# Patient Record
Sex: MALE | Race: BLACK OR AFRICAN AMERICAN | NOT HISPANIC OR LATINO | Employment: FULL TIME | RURAL
[De-identification: names, ages, dates, MRNs, and addresses within clinical notes are randomized per-mention and may not be internally consistent; named-entity substitution may affect disease eponyms.]

---

## 2018-08-09 NOTE — PROGRESS NOTES
Subjective   Antwon Sifuentes is a 33 y.o. male.   Problem List  1. Overweight  2. Former tobacco smoker   3. Right forearm pain     PShx  -none     SocialHx  - at [employer]  -former tobacco smoker. Currently vapes  -no alcohol use   -single  -no children   -no illicit drugs     FamilyHx  -mother - none  -father - () suicide     Pt is 34 yo male who  I am seeing for the first time. He is here to establish. He has not seen a PCP in years. He may go to the ER if he has a medical issues. He needs a physical today. And his other main issue is pain in his right arm/forearm that has been present for years. He injured his right arm at home. He slipped on his hand at home.  He has had x-rays. It is getting worse. He also presents with numbness and tingling.  No chest pain.  Pain it comes and goes.   Pain is 5/10 on severity. He is  at [employer]. He is left handed. He mainly throws on his right hand.  No alcohol abuse. He is a former smoker and currently vapes. He is single with no children. No illicit drug use.  He went to the ER recently at [facility] for this issue and was given antiinflammatory medication. IT does help. He could not remember the name     He also has rash on inguinal area. He is sexually active with 10 partners. Sometimes he uses condoms. He would like to be tested for HIV and hepatitis along with STD panel     Arm Pain    The incident occurred more than 1 week ago. The incident occurred at home. The injury mechanism was a fall. The pain is present in the right hand and right forearm. The quality of the pain is described as aching and cramping. The pain does not radiate. The pain is at a severity of 5/10. The pain is mild. The pain has been intermittent since the incident. Associated symptoms include numbness and tingling. Pertinent negatives include no chest pain or muscle weakness. Nothing aggravates the symptoms. He has tried nothing for the symptoms. The treatment provided no  relief.   Upper Extremity Issue   This is a chronic problem. The current episode started more than 1 year ago. The problem occurs intermittently. The problem has been gradually worsening. Associated symptoms include numbness. Pertinent negatives include no abdominal pain, anorexia, arthralgias, change in bowel habit, chest pain, chills, congestion, coughing, diaphoresis, fatigue, fever, headaches, joint swelling, myalgias, nausea, neck pain, rash, sore throat, swollen glands, urinary symptoms, vertigo, visual change, vomiting or weakness. Nothing aggravates the symptoms. He has tried NSAIDs for the symptoms. The treatment provided significant relief.          The following portions of the patient's history were reviewed and updated as appropriate: allergies, current medications, past family history, past medical history, past social history, past surgical history and problem list.  Patient Active Problem List   Diagnosis   • Annual physical exam   • General medical examination   • Tobacco abuse counseling   • Tobacco user   • Need for vaccination   • Overweight   • Encounter for screening for cardiovascular disorders   • Encounter for screening for diabetes mellitus   • Encounter for screening for endocrine disorder   • Jock itch   • Former tobacco use   • Encounter for screening for HIV   • Screening examination for STD (sexually transmitted disease)   • Pain of right hand   • Pain of right forearm     No current outpatient prescriptions on file prior to visit.     No current facility-administered medications on file prior to visit.        Review of Systems   Constitutional: Negative.  Negative for chills, diaphoresis, fatigue and fever.   HENT: Negative.  Negative for congestion and sore throat.    Eyes: Negative.    Respiratory: Negative.  Negative for cough.    Cardiovascular: Negative.  Negative for chest pain.   Gastrointestinal: Negative.  Negative for abdominal pain, anorexia, change in bowel habit, nausea  "and vomiting.   Endocrine: Negative.    Genitourinary: Negative.    Musculoskeletal: Negative.  Negative for arthralgias, joint swelling, myalgias and neck pain.   Skin: Negative.  Negative for rash.   Allergic/Immunologic: Negative.    Neurological: Positive for tingling and numbness. Negative for vertigo and weakness.   Hematological: Negative.    Psychiatric/Behavioral: Negative.        Objective    /77   Pulse 60   Temp 97.9 °F (36.6 °C)   Ht 182.9 cm (72\")   Wt 94.9 kg (209 lb 3.2 oz)   SpO2 97%   BMI 28.37 kg/m²       Physical Exam   Constitutional: He is oriented to person, place, and time. He appears well-developed and well-nourished. No distress.   HENT:   Head: Normocephalic and atraumatic.   Right Ear: External ear normal.   Left Ear: External ear normal.   Eyes: Pupils are equal, round, and reactive to light. Conjunctivae and EOM are normal. Right eye exhibits no discharge. Left eye exhibits no discharge. No scleral icterus.   Neck: Normal range of motion. Neck supple. No JVD present. No tracheal deviation present. No thyromegaly present.   Cardiovascular: Normal rate, regular rhythm and normal heart sounds.    Pulmonary/Chest: Effort normal. No respiratory distress. He has no wheezes.   Abdominal: Soft. He exhibits no distension. There is no tenderness. There is no guarding.   Musculoskeletal: Normal range of motion. He exhibits no edema, tenderness or deformity.   Lymphadenopathy:     He has no cervical adenopathy.   Neurological: He is oriented to person, place, and time. No cranial nerve deficit.   Skin: Skin is warm and dry. No rash noted. He is not diaphoretic. No erythema. No pallor.   Psychiatric: He has a normal mood and affect. His behavior is normal.   Nursing note and vitals reviewed.        Assessment/Plan   Problems Addressed this Visit        Nervous and Auditory    Pain of right hand    Relevant Orders    XR forearm 2 vw right    XR Hand 3+ View Right    Pain of right forearm "    Relevant Orders    XR forearm 2 vw right    XR Hand 3+ View Right       Musculoskeletal and Integument    Jock itch    Relevant Medications    clotrimazole-betamethasone (LOTRISONE) 1-0.05 % cream       Other    Annual physical exam    General medical examination - Primary    Relevant Orders    CBC Auto Differential    Hemoglobin A1c    Lipid Panel    Comprehensive Metabolic Panel    TSH    T4, Free    T3, Free    Vitamin D 25 Hydroxy    Need for vaccination    Relevant Orders    Tdap Vaccine Greater Than or Equal To 8yo IM (Completed)    Overweight    Relevant Orders    Vitamin D 25 Hydroxy    Encounter for screening for cardiovascular disorders    Relevant Orders    Lipid Panel    Encounter for screening for diabetes mellitus    Relevant Orders    Hemoglobin A1c    Encounter for screening for endocrine disorder    Relevant Orders    TSH    T4, Free    T3, Free    Former tobacco use    Encounter for screening for HIV    Relevant Orders    HIV-1 & HIV-2 Antibodies    Hepatitis Panel, Acute    Screening examination for STD (sexually transmitted disease)    Relevant Orders    HIV-1 & HIV-2 Antibodies    Hepatitis Panel, Acute    Hepatitis C RNA, quantitative, PCR (graph)    HSV 1 and 2 IgM, Abs, Indirect    HSV 1 and 2-Specific Ab, IgG    RPR    HIV 2 Antibody Screen w reflex      -annual physical exam today  -regarding history of pain in right arm - will get x-ray to see if there are any new changes.  He declines any NSAIDS for now. States pain is controlled. If it gets worse he will let me know. Consider PT/OT  -STD screening panel  Including HIV test   -recheck in 1-2 weeks   -jock itch - information given today. Will start on clotrimazole cream TID. Drug information provided   -will get labs including cbc, cmp,lipid panel, thyroid studies, hga1c. And vitamin D levels   -gave weight loss information today.   -smoking cessation tips given today.    -tdap vaccination today   -for rash on inguinal area - possible  jock itch. - will give lotrione cream   -advised pt to be safe and call with any questions or concerns. All questions addressed today.    -will write letter stating pt can go back to work          This document has been electronically signed by Rey Nunez MD on August 10, 2018 9:02 AM

## 2018-08-10 ENCOUNTER — OFFICE VISIT (OUTPATIENT)
Dept: FAMILY MEDICINE CLINIC | Facility: CLINIC | Age: 33
End: 2018-08-10

## 2018-08-10 VITALS
HEART RATE: 60 BPM | HEIGHT: 72 IN | DIASTOLIC BLOOD PRESSURE: 77 MMHG | OXYGEN SATURATION: 97 % | BODY MASS INDEX: 28.33 KG/M2 | TEMPERATURE: 97.9 F | SYSTOLIC BLOOD PRESSURE: 122 MMHG | WEIGHT: 209.2 LBS

## 2018-08-10 DIAGNOSIS — Z00.00 GENERAL MEDICAL EXAMINATION: Primary | ICD-10-CM

## 2018-08-10 DIAGNOSIS — Z23 NEED FOR VACCINATION: ICD-10-CM

## 2018-08-10 DIAGNOSIS — E66.3 OVERWEIGHT: ICD-10-CM

## 2018-08-10 DIAGNOSIS — M79.631 PAIN OF RIGHT FOREARM: ICD-10-CM

## 2018-08-10 DIAGNOSIS — Z13.6 ENCOUNTER FOR SCREENING FOR CARDIOVASCULAR DISORDERS: ICD-10-CM

## 2018-08-10 DIAGNOSIS — Z11.4 ENCOUNTER FOR SCREENING FOR HIV: ICD-10-CM

## 2018-08-10 DIAGNOSIS — Z87.891 FORMER TOBACCO USE: ICD-10-CM

## 2018-08-10 DIAGNOSIS — B35.6 JOCK ITCH: ICD-10-CM

## 2018-08-10 DIAGNOSIS — Z13.1 ENCOUNTER FOR SCREENING FOR DIABETES MELLITUS: ICD-10-CM

## 2018-08-10 DIAGNOSIS — M79.641 PAIN OF RIGHT HAND: ICD-10-CM

## 2018-08-10 DIAGNOSIS — Z11.3 SCREENING EXAMINATION FOR STD (SEXUALLY TRANSMITTED DISEASE): ICD-10-CM

## 2018-08-10 DIAGNOSIS — Z13.29 ENCOUNTER FOR SCREENING FOR ENDOCRINE DISORDER: ICD-10-CM

## 2018-08-10 DIAGNOSIS — Z00.00 ANNUAL PHYSICAL EXAM: ICD-10-CM

## 2018-08-10 PROBLEM — Z72.0 TOBACCO USER: Status: ACTIVE | Noted: 2018-08-10

## 2018-08-10 PROBLEM — Z71.6 TOBACCO ABUSE COUNSELING: Status: ACTIVE | Noted: 2018-08-10

## 2018-08-10 PROCEDURE — 90715 TDAP VACCINE 7 YRS/> IM: CPT | Performed by: FAMILY MEDICINE

## 2018-08-10 PROCEDURE — 99204 OFFICE O/P NEW MOD 45 MIN: CPT | Performed by: FAMILY MEDICINE

## 2018-08-10 PROCEDURE — 90471 IMMUNIZATION ADMIN: CPT | Performed by: FAMILY MEDICINE

## 2018-08-10 RX ORDER — CLOTRIMAZOLE AND BETAMETHASONE DIPROPIONATE 10; .64 MG/G; MG/G
CREAM TOPICAL EVERY 12 HOURS SCHEDULED
Qty: 45 G | Refills: 3 | Status: SHIPPED | OUTPATIENT
Start: 2018-08-10 | End: 2018-08-24

## 2018-08-10 NOTE — PATIENT INSTRUCTIONS
Exercising to Lose Weight  Exercising can help you to lose weight. In order to lose weight through exercise, you need to do vigorous-intensity exercise. You can tell that you are exercising with vigorous intensity if you are breathing very hard and fast and cannot hold a conversation while exercising.  Moderate-intensity exercise helps to maintain your current weight. You can tell that you are exercising at a moderate level if you have a higher heart rate and faster breathing, but you are still able to hold a conversation.  How often should I exercise?  Choose an activity that you enjoy and set realistic goals. Your health care provider can help you to make an activity plan that works for you. Exercise regularly as directed by your health care provider. This may include:  · Doing resistance training twice each week, such as:  ? Push-ups.  ? Sit-ups.  ? Lifting weights.  ? Using resistance bands.  · Doing a given intensity of exercise for a given amount of time. Choose from these options:  ? 150 minutes of moderate-intensity exercise every week.  ? 75 minutes of vigorous-intensity exercise every week.  ? A mix of moderate-intensity and vigorous-intensity exercise every week.    Children, pregnant women, people who are out of shape, people who are overweight, and older adults may need to consult a health care provider for individual recommendations. If you have any sort of medical condition, be sure to consult your health care provider before starting a new exercise program.  What are some activities that can help me to lose weight?  · Walking at a rate of at least 4.5 miles an hour.  · Jogging or running at a rate of 5 miles per hour.  · Biking at a rate of at least 10 miles per hour.  · Lap swimming.  · Roller-skating or in-line skating.  · Cross-country skiing.  · Vigorous competitive sports, such as football, basketball, and soccer.  · Jumping rope.  · Aerobic dancing.  How can I be more active in my day-to-day  activities?  · Use the stairs instead of the elevator.  · Take a walk during your lunch break.  · If you drive, park your car farther away from work or school.  · If you take public transportation, get off one stop early and walk the rest of the way.  · Make all of your phone calls while standing up and walking around.  · Get up, stretch, and walk around every 30 minutes throughout the day.  What guidelines should I follow while exercising?  · Do not exercise so much that you hurt yourself, feel dizzy, or get very short of breath.  · Consult your health care provider prior to starting a new exercise program.  · Wear comfortable clothes and shoes with good support.  · Drink plenty of water while you exercise to prevent dehydration or heat stroke. Body water is lost during exercise and must be replaced.  · Work out until you breathe faster and your heart beats faster.  This information is not intended to replace advice given to you by your health care provider. Make sure you discuss any questions you have with your health care provider.  Document Released: 01/20/2012 Document Revised: 05/25/2017 Document Reviewed: 05/21/2015  [employer] Interactive Patient Education © 2018 [employer]    Calorie Counting for Weight Loss  Calories are units of energy. Your body needs a certain amount of calories from food to keep you going throughout the day. When you eat more calories than your body needs, your body stores the extra calories as fat. When you eat fewer calories than your body needs, your body burns fat to get the energy it needs.  Calorie counting means keeping track of how many calories you eat and drink each day. Calorie counting can be helpful if you need to lose weight. If you make sure to eat fewer calories than your body needs, you should lose weight. Ask your health care provider what a healthy weight is for you.  For calorie counting to work, you will need to eat the right number of calories in a day in order  to lose a healthy amount of weight per week. A dietitian can help you determine how many calories you need in a day and will give you suggestions on how to reach your calorie goal.  · A healthy amount of weight to lose per week is usually 1-2 lb (0.5-0.9 kg). This usually means that your daily calorie intake should be reduced by 500-750 calories.  · Eating 1,200 - 1,500 calories per day can help most women lose weight.  · Eating 1,500 - 1,800 calories per day can help most men lose weight.    What do I need to know about calorie counting?  In order to meet your daily calorie goal, you will need to:  · Find out how many calories are in each food you would like to eat. Try to do this before you eat.  · Decide how much of the food you plan to eat.  · Write down what you ate and how many calories it had. Doing this is called keeping a food log.    To successfully lose weight, it is important to balance calorie counting with a healthy lifestyle that includes regular activity. Aim for 150 minutes of moderate exercise (such as walking) or 75 minutes of vigorous exercise (such as running) each week.  Where do I find calorie information?    The number of calories in a food can be found on a Nutrition Facts label. If a food does not have a Nutrition Facts label, try to look up the calories online or ask your dietitian for help.  Remember that calories are listed per serving. If you choose to have more than one serving of a food, you will have to multiply the calories per serving by the amount of servings you plan to eat. For example, the label on a package of bread might say that a serving size is 1 slice and that there are 90 calories in a serving. If you eat 1 slice, you will have eaten 90 calories. If you eat 2 slices, you will have eaten 180 calories.  How do I keep a food log?  Immediately after each meal, record the following information in your food log:  · What you ate. Don't forget to include toppings, sauces, and  "other extras on the food.  · How much you ate. This can be measured in cups, ounces, or number of items.  · How many calories each food and drink had.  · The total number of calories in the meal.    Keep your food log near you, such as in a small notebook in your pocket, or use a mobile aileen or website. Some programs will calculate calories for you and show you how many calories you have left for the day to meet your goal.  What are some calorie counting tips?  · Use your calories on foods and drinks that will fill you up and not leave you hungry:  ? Some examples of foods that fill you up are nuts and nut butters, vegetables, lean proteins, and high-fiber foods like whole grains. High-fiber foods are foods with more than 5 g fiber per serving.  ? Drinks such as sodas, specialty coffee drinks, alcohol, and juices have a lot of calories, yet do not fill you up.  · Eat nutritious foods and avoid empty calories. Empty calories are calories you get from foods or beverages that do not have many vitamins or protein, such as candy, sweets, and soda. It is better to have a nutritious high-calorie food (such as an avocado) than a food with few nutrients (such as a bag of chips).  · Know how many calories are in the foods you eat most often. This will help you calculate calorie counts faster.  · Pay attention to calories in drinks. Low-calorie drinks include water and unsweetened drinks.  · Pay attention to nutrition labels for \"low fat\" or \"fat free\" foods. These foods sometimes have the same amount of calories or more calories than the full fat versions. They also often have added sugar, starch, or salt, to make up for flavor that was removed with the fat.  · Find a way of tracking calories that works for you. Get creative. Try different apps or programs if writing down calories does not work for you.  What are some portion control tips?  · Know how many calories are in a serving. This will help you know how many servings of " a certain food you can have.  · Use a measuring cup to measure serving sizes. You could also try weighing out portions on a kitchen scale. With time, you will be able to estimate serving sizes for some foods.  · Take some time to put servings of different foods on your favorite plates, bowls, and cups so you know what a serving looks like.  · Try not to eat straight from a bag or box. Doing this can lead to overeating. Put the amount you would like to eat in a cup or on a plate to make sure you are eating the right portion.  · Use smaller plates, glasses, and bowls to prevent overeating.  · Try not to multitask (for example, watch TV or use your computer) while eating. If it is time to eat, sit down at a table and enjoy your food. This will help you to know when you are full. It will also help you to be aware of what you are eating and how much you are eating.  What are tips for following this plan?  Reading food labels  · Check the calorie count compared to the serving size. The serving size may be smaller than what you are used to eating.  · Check the source of the calories. Make sure the food you are eating is high in vitamins and protein and low in saturated and trans fats.  Shopping  · Read nutrition labels while you shop. This will help you make healthy decisions before you decide to purchase your food.  · Make a grocery list and stick to it.  Cooking  · Try to cook your favorite foods in a healthier way. For example, try baking instead of frying.  · Use low-fat dairy products.  Meal planning  · Use more fruits and vegetables. Half of your plate should be fruits and vegetables.  · Include lean proteins like poultry and fish.  How do I count calories when eating out?  · Ask for smaller portion sizes.  · Consider sharing an entree and sides instead of getting your own entree.  · If you get your own entree, eat only half. Ask for a box at the beginning of your meal and put the rest of your entree in it so you are  not tempted to eat it.  · If calories are listed on the menu, choose the lower calorie options.  · Choose dishes that include vegetables, fruits, whole grains, low-fat dairy products, and lean protein.  · Choose items that are boiled, broiled, grilled, or steamed. Stay away from items that are buttered, battered, fried, or served with cream sauce. Items labeled “crispy” are usually fried, unless stated otherwise.  · Choose water, low-fat milk, unsweetened iced tea, or other drinks without added sugar. If you want an alcoholic beverage, choose a lower calorie option such as a glass of wine or light beer.  · Ask for dressings, sauces, and syrups on the side. These are usually high in calories, so you should limit the amount you eat.  · If you want a salad, choose a garden salad and ask for grilled meats. Avoid extra toppings like bradshaw, cheese, or fried items. Ask for the dressing on the side, or ask for olive oil and vinegar or lemon to use as dressing.  · Estimate how many servings of a food you are given. For example, a serving of cooked rice is ½ cup or about the size of half a baseball. Knowing serving sizes will help you be aware of how much food you are eating at restaurants. The list below tells you how big or small some common portion sizes are based on everyday objects:  ? 1 oz--4 stacked dice.  ? 3 oz--1 deck of cards.  ? 1 tsp--1 die.  ? 1 Tbsp--½ a ping-pong ball.  ? 2 Tbsp--1 ping-pong ball.  ? ½ cup--½ baseball.  ? 1 cup--1 baseball.  Summary  · Calorie counting means keeping track of how many calories you eat and drink each day. If you eat fewer calories than your body needs, you should lose weight.  · A healthy amount of weight to lose per week is usually 1-2 lb (0.5-0.9 kg). This usually means reducing your daily calorie intake by 500-750 calories.  · The number of calories in a food can be found on a Nutrition Facts label. If a food does not have a Nutrition Facts label, try to look up the calories  online or ask your dietitian for help.  · Use your calories on foods and drinks that will fill you up, and not on foods and drinks that will leave you hungry.  · Use smaller plates, glasses, and bowls to prevent overeating.  This information is not intended to replace advice given to you by your health care provider. Make sure you discuss any questions you have with your health care provider.  Document Released: 12/18/2006 Document Revised: 11/17/2017 Document Reviewed: 11/17/2017  [employer] Interactive Patient Education © 2018 [employer]    Steps to Quit Smoking  Smoking tobacco can be harmful to your health and can affect almost every organ in your body. Smoking puts you, and those around you, at risk for developing many serious chronic diseases. Quitting smoking is difficult, but it is one of the best things that you can do for your health. It is never too late to quit.  What are the benefits of quitting smoking?  When you quit smoking, you lower your risk of developing serious diseases and conditions, such as:  · Lung cancer or lung disease, such as COPD.  · Heart disease.  · Stroke.  · Heart attack.  · Infertility.  · Osteoporosis and bone fractures.    Additionally, symptoms such as coughing, wheezing, and shortness of breath may get better when you quit. You may also find that you get sick less often because your body is stronger at fighting off colds and infections. If you are pregnant, quitting smoking can help to reduce your chances of having a baby of low birth weight.  How do I get ready to quit?  When you decide to quit smoking, create a plan to make sure that you are successful. Before you quit:  · Pick a date to quit. Set a date within the next two weeks to give you time to prepare.  · Write down the reasons why you are quitting. Keep this list in places where you will see it often, such as on your bathroom mirror or in your car or wallet.  · Identify the people, places, things, and activities that  make you want to smoke (triggers) and avoid them. Make sure to take these actions:  ? Throw away all cigarettes at home, at work, and in your car.  ? Throw away smoking accessories, such as ashtrays and lighters.  ? Clean your car and make sure to empty the ashtray.  ? Clean your home, including curtains and carpets.  · Tell your family, friends, and coworkers that you are quitting. Support from your loved ones can make quitting easier.  · Talk with your health care provider about your options for quitting smoking.  · Find out what treatment options are covered by your health insurance.    What strategies can I use to quit smoking?  Talk with your healthcare provider about different strategies to quit smoking. Some strategies include:  · Quitting smoking altogether instead of gradually lessening how much you smoke over a period of time. Research shows that quitting “cold turkey” is more successful than gradually quitting.  · Attending in-person counseling to help you build problem-solving skills. You are more likely to have success in quitting if you attend several counseling sessions. Even short sessions of 10 minutes can be effective.  · Finding resources and support systems that can help you to quit smoking and remain smoke-free after you quit. These resources are most helpful when you use them often. They can include:  ? Online chats with a counselor.  ? Telephone quitlines.  ? Printed self-help materials.  ? Support groups or group counseling.  ? Text messaging programs.  ? Mobile phone applications.  · Taking medicines to help you quit smoking. (If you are pregnant or breastfeeding, talk with your health care provider first.) Some medicines contain nicotine and some do not. Both types of medicines help with cravings, but the medicines that include nicotine help to relieve withdrawal symptoms. Your health care provider may recommend:  ? Nicotine patches, gum, or lozenges.  ? Nicotine inhalers or  sprays.  ? Non-nicotine medicine that is taken by mouth.    Talk with your health care provider about combining strategies, such as taking medicines while you are also receiving in-person counseling. Using these two strategies together makes you more likely to succeed in quitting than if you used either strategy on its own.  If you are pregnant or breastfeeding, talk with your health care provider about finding counseling or other support strategies to quit smoking. Do not take medicine to help you quit smoking unless told to do so by your health care provider.  What things can I do to make it easier to quit?  Quitting smoking might feel overwhelming at first, but there is a lot that you can do to make it easier. Take these important actions:  · Reach out to your family and friends and ask that they support and encourage you during this time. Call telephone quitlines, reach out to support groups, or work with a counselor for support.  · Ask people who smoke to avoid smoking around you.  · Avoid places that trigger you to smoke, such as bars, parties, or smoke-break areas at work.  · Spend time around people who do not smoke.  · Lessen stress in your life, because stress can be a smoking trigger for some people. To lessen stress, try:  ? Exercising regularly.  ? Deep-breathing exercises.  ? Yoga.  ? Meditating.  ? Performing a body scan. This involves closing your eyes, scanning your body from head to toe, and noticing which parts of your body are particularly tense. Purposefully relax the muscles in those areas.  · Download or purchase mobile phone or tablet apps (applications) that can help you stick to your quit plan by providing reminders, tips, and encouragement. There are many free apps, such as QuitGuide from the CDC (Centers for Disease Control and Prevention). You can find other support for quitting smoking (smoking cessation) through smokefree.gov and other websites.    How will I feel when I quit  smoking?  Within the first 24 hours of quitting smoking, you may start to feel some withdrawal symptoms. These symptoms are usually most noticeable 2-3 days after quitting, but they usually do not last beyond 2-3 weeks. Changes or symptoms that you might experience include:  · Mood swings.  · Restlessness, anxiety, or irritation.  · Difficulty concentrating.  · Dizziness.  · Strong cravings for sugary foods in addition to nicotine.  · Mild weight gain.  · Constipation.  · Nausea.  · Coughing or a sore throat.  · Changes in how your medicines work in your body.  · A depressed mood.  · Difficulty sleeping (insomnia).    After the first 2-3 weeks of quitting, you may start to notice more positive results, such as:  · Improved sense of smell and taste.  · Decreased coughing and sore throat.  · Slower heart rate.  · Lower blood pressure.  · Clearer skin.  · The ability to breathe more easily.  · Fewer sick days.    Quitting smoking is very challenging for most people. Do not get discouraged if you are not successful the first time. Some people need to make many attempts to quit before they achieve long-term success. Do your best to stick to your quit plan, and talk with your health care provider if you have any questions or concerns.  This information is not intended to replace advice given to you by your health care provider. Make sure you discuss any questions you have with your health care provider.  Document Released: 12/12/2002 Document Revised: 08/15/2017 Document Reviewed: 05/03/2016  [employer] Interactive Patient Education © 2018 [employer]  Clotrimazole skin cream, lotion, or solution  What is this medicine?  CLOTRIMAZOLE (kloe TRIM a zole) is an antifungal medicine. It is used to treat certain kinds of fungal or yeast infections of the skin.  This medicine may be used for other purposes; ask your health care provider or pharmacist if you have questions.  COMMON BRAND NAME(S): Anti-Fungal, Antifungal, Cruex,  Desenex, Fungoid, Lotrimin, Lotrimin AF, Lotrimin AF Ringworm  What should I tell my health care provider before I take this medicine?  They need to know if you have any of these conditions:  -an unusual or allergic reaction to clotrimazole, other antifungals or medicines, foods, dyes or preservatives  -pregnant or trying to get pregnant  -breast-feeding  How should I use this medicine?  This medicine is for external use only. Do not take by mouth. Follow the directions on the prescription label. Wash your hands before and after use. If treating a hand or nail infection, wash hands before use only. Apply a thin layer to the affected area and a small amount to the surrounding area. Rub in gently. Do not get this medicine in your eyes. If you do, rinse out with plenty of cool tap water. Use this medicine at regular intervals. Do not use more often than directed. Finish the full course prescribed by your doctor or health care professional even if you think you are better. Do not stop using except on your doctor's advice.  Talk to your pediatrician regarding the use of this medicine in children. While this drug has been used in young children for selected conditions, precautions do apply.  Overdosage: If you think you have taken too much of this medicine contact a poison control center or emergency room at once.  NOTE: This medicine is only for you. Do not share this medicine with others.  What if I miss a dose?  If you miss a dose, use it as soon as you can. If it is almost time for your next dose, use only that dose. Do not use double or extra doses.  What may interact with this medicine?  -amphotericin b  -topical products that have nystatin  This list may not describe all possible interactions. Give your health care provider a list of all the medicines, herbs, non-prescription drugs, or dietary supplements you use. Also tell them if you smoke, drink alcohol, or use illegal drugs. Some items may interact with your  medicine.  What should I watch for while using this medicine?  Tell your doctor or health care professional if your symptoms do not start to improve after 7 days. Do not self-medicate for more than one week.  If you are using this medicine for 'jock itch' be sure to dry the groin completely after bathing. Do not wear underwear that is tight-fitting or made from synthetic fibers like rosibel or nylon. Wear loose-fitting, cotton underwear.  If you are using this medicine for athlete's foot be sure to dry your feet carefully after bathing, especially between the toes. Do not wear socks made from wool or synthetic materials like rosibel or nylon. Wear clean cotton socks and change them at least once a day, change them more if your feet sweat a lot. Also, try to wear sandals or shoes that are well-ventilated.  What side effects may I notice from receiving this medicine?  Side effects that usually do not require medical attention (report to your doctor or health care professional if they continue or are bothersome):  -allergic reactions like skin rash, itching or hives, swelling of the face, lips, or tongue  -skin irritation, burning  This list may not describe all possible side effects. Call your doctor for medical advice about side effects. You may report side effects to FDA at 7-415-FDA-8892.  Where should I keep my medicine?  Keep out of the reach of children.  Store at room temperature between 2 to 30 degrees C (36 to 86 degrees F). Do not freeze. Throw away any unused medicine after the expiration date.  NOTE: This sheet is a summary. It may not cover all possible information. If you have questions about this medicine, talk to your doctor, pharmacist, or health care provider.  © 2018 [employer] (2017-01-18 16:30:18)

## 2018-08-14 LAB
25(OH)D3 SERPL-MCNC: 30.9 NG/ML (ref 30–100)
ALBUMIN SERPL-MCNC: 5 G/DL (ref 3.4–4.8)
ALBUMIN/GLOB SERPL: 1.4 G/DL (ref 1.1–1.8)
ALP SERPL-CCNC: 44 U/L (ref 38–126)
ALT SERPL W P-5'-P-CCNC: 41 U/L (ref 21–72)
ANION GAP SERPL CALCULATED.3IONS-SCNC: 11 MMOL/L (ref 5–15)
ARTICHOKE IGE QN: 52 MG/DL (ref 1–129)
AST SERPL-CCNC: 23 U/L (ref 17–59)
BASOPHILS # BLD MANUAL: 0.07 10*3/MM3 (ref 0–0.2)
BASOPHILS NFR BLD AUTO: 1 % (ref 0–2)
BILIRUB SERPL-MCNC: 0.9 MG/DL (ref 0.2–1.3)
BUN BLD-MCNC: 21 MG/DL (ref 7–21)
BUN/CREAT SERPL: 26.6 (ref 7–25)
CALCIUM SPEC-SCNC: 9.8 MG/DL (ref 8.4–10.2)
CHLORIDE SERPL-SCNC: 99 MMOL/L (ref 95–110)
CHOLEST SERPL-MCNC: 140 MG/DL (ref 0–199)
CO2 SERPL-SCNC: 29 MMOL/L (ref 22–31)
CREAT BLD-MCNC: 0.79 MG/DL (ref 0.7–1.3)
DEPRECATED RDW RBC AUTO: 39.2 FL (ref 35.1–43.9)
EOSINOPHIL # BLD MANUAL: 0.07 10*3/MM3 (ref 0–0.7)
EOSINOPHIL NFR BLD MANUAL: 1 % (ref 0–7)
ERYTHROCYTE [DISTWIDTH] IN BLOOD BY AUTOMATED COUNT: 12.3 % (ref 11.5–14.5)
GFR SERPL CREATININE-BSD FRML MDRD: 138 ML/MIN/1.73 (ref 70–162)
GLOBULIN UR ELPH-MCNC: 3.5 GM/DL (ref 2.3–3.5)
GLUCOSE BLD-MCNC: 80 MG/DL (ref 60–100)
HBA1C MFR BLD: 4.7 % (ref 4–5.6)
HCT VFR BLD AUTO: 43.6 % (ref 39–49)
HDLC SERPL-MCNC: 60 MG/DL (ref 60–200)
HGB BLD-MCNC: 15.2 G/DL (ref 13.7–17.3)
HIV1+2 AB SER QL: NEGATIVE
LDLC/HDLC SERPL: 1.17 {RATIO} (ref 0–3.55)
LYMPHOCYTES # BLD MANUAL: 3.65 10*3/MM3 (ref 0.6–4.2)
LYMPHOCYTES NFR BLD MANUAL: 3 % (ref 0–12)
LYMPHOCYTES NFR BLD MANUAL: 49 % (ref 10–50)
MCH RBC QN AUTO: 30.9 PG (ref 26.5–34)
MCHC RBC AUTO-ENTMCNC: 34.9 G/DL (ref 31.5–36.3)
MCV RBC AUTO: 88.6 FL (ref 80–98)
MONOCYTES # BLD AUTO: 0.22 10*3/MM3 (ref 0–0.9)
NEUTROPHILS # BLD AUTO: 2.75 10*3/MM3 (ref 2–8.6)
NEUTROPHILS NFR BLD MANUAL: 37 % (ref 37–80)
PLAT MORPH BLD: NORMAL
PLATELET # BLD AUTO: 266 10*3/MM3 (ref 150–450)
PMV BLD AUTO: 9.8 FL (ref 8–12)
POTASSIUM BLD-SCNC: 4.2 MMOL/L (ref 3.5–5.1)
PROT SERPL-MCNC: 8.5 G/DL (ref 6.3–8.6)
RBC # BLD AUTO: 4.92 10*6/MM3 (ref 4.37–5.74)
RBC MORPH BLD: NORMAL
SODIUM BLD-SCNC: 139 MMOL/L (ref 137–145)
T4 FREE SERPL-MCNC: 1.44 NG/DL (ref 0.78–2.19)
TRIGL SERPL-MCNC: 50 MG/DL (ref 20–199)
TSH SERPL DL<=0.05 MIU/L-ACNC: 1.31 MIU/ML (ref 0.46–4.68)
VARIANT LYMPHS NFR BLD MANUAL: 9 % (ref 0–5)
WBC MORPH BLD: NORMAL
WBC NRBC COR # BLD: 7.44 10*3/MM3 (ref 3.2–9.8)

## 2018-08-14 PROCEDURE — 80074 ACUTE HEPATITIS PANEL: CPT | Performed by: FAMILY MEDICINE

## 2018-08-14 PROCEDURE — G0432 EIA HIV-1/HIV-2 SCREEN: HCPCS | Performed by: FAMILY MEDICINE

## 2018-08-14 PROCEDURE — 85025 COMPLETE CBC W/AUTO DIFF WBC: CPT | Performed by: FAMILY MEDICINE

## 2018-08-14 PROCEDURE — 86702 HIV-2 ANTIBODY: CPT | Performed by: FAMILY MEDICINE

## 2018-08-14 PROCEDURE — 84439 ASSAY OF FREE THYROXINE: CPT | Performed by: FAMILY MEDICINE

## 2018-08-14 PROCEDURE — 82306 VITAMIN D 25 HYDROXY: CPT | Performed by: FAMILY MEDICINE

## 2018-08-14 PROCEDURE — 83036 HEMOGLOBIN GLYCOSYLATED A1C: CPT | Performed by: FAMILY MEDICINE

## 2018-08-14 PROCEDURE — 86696 HERPES SIMPLEX TYPE 2 TEST: CPT | Performed by: FAMILY MEDICINE

## 2018-08-14 PROCEDURE — 84443 ASSAY THYROID STIM HORMONE: CPT | Performed by: FAMILY MEDICINE

## 2018-08-14 PROCEDURE — 86592 SYPHILIS TEST NON-TREP QUAL: CPT | Performed by: FAMILY MEDICINE

## 2018-08-14 PROCEDURE — 84481 FREE ASSAY (FT-3): CPT | Performed by: FAMILY MEDICINE

## 2018-08-14 PROCEDURE — 85007 BL SMEAR W/DIFF WBC COUNT: CPT | Performed by: FAMILY MEDICINE

## 2018-08-14 PROCEDURE — 80053 COMPREHEN METABOLIC PANEL: CPT | Performed by: FAMILY MEDICINE

## 2018-08-14 PROCEDURE — 86695 HERPES SIMPLEX TYPE 1 TEST: CPT | Performed by: FAMILY MEDICINE

## 2018-08-14 PROCEDURE — 87522 HEPATITIS C REVRS TRNSCRPJ: CPT | Performed by: FAMILY MEDICINE

## 2018-08-14 PROCEDURE — 80061 LIPID PANEL: CPT | Performed by: FAMILY MEDICINE

## 2018-08-15 LAB
HAV IGM SERPL QL IA: NEGATIVE
HBV CORE IGM SERPL QL IA: NEGATIVE
HBV SURFACE AG SERPL QL IA: NEGATIVE
HCV AB SER DONR QL: NEGATIVE
RPR SER QL: NORMAL
T3FREE SERPL-MCNC: 4.1 PG/ML (ref 2–4.4)

## 2018-08-16 LAB
HCV RNA SERPL NAA+PROBE-ACNC: NORMAL IU/ML
HSV1 IGG SER IA-ACNC: 17.6 INDEX (ref 0–0.9)
HSV2 IGG SER IA-ACNC: <0.91 INDEX (ref 0–0.9)
TEST INFORMATION: NORMAL

## 2018-08-19 LAB
HSV1 IGM TITR SER IF: NORMAL TITER
HSV2 IGM TITR SER IF: NORMAL TITER

## 2018-08-20 LAB — HIV 2 AB SERPL QL IA: NEGATIVE

## 2018-08-22 NOTE — PROGRESS NOTES
Subjective   Antwon Sifuentes is a 33 y.o. male.   Problem List  1. Overweight  2. Former tobacco smoker   3. Right forearm pain   4. HSV type 1 positive     PShx  -none     SocialHx  - at [facility]  -former tobacco smoker. Currently vapes  -no alcohol use   -single  -no children   -no illicit drugs     FamilyHx  -mother - none  -father - () suicide     Pt is 34 yo male who  I am seeing for the first time. He is here to establish. He has not seen a PCP in years. He may go to the ER if he has a medical issues. He needs a physical today. And his other main issue is pain in his right arm/forearm that has been present for years. He injured his right arm at home. He slipped on his hand at home.  He has had x-rays. It is getting worse. He also presents with numbness and tingling.  No chest pain.  Pain it comes and goes.   Pain is 5/10 on severity. He is  at [facility]. He is left handed. He mainly throws on his right hand.  No alcohol abuse. He is a former smoker and currently vapes. He is single with no children. No illicit drug use.  He went to the ER recently at [facility] for this issue and was given antiinflammatory medication. IT does help. He could not remember the name     He also has rash on inguinal area. He is sexually active with 10 partners. Sometimes he uses condoms. He would like to be tested for HIV and hepatitis along with STD panel     18 pt is here recheck and go over labwork. He had full STD panel that was negatiev for HIV and RPRO. Hepatitis panel was normal Vitamin D was normal his thyroid studies were normal.  Hga1c was normal and CMP. Showed normal renal and liver function with slightly elevation in albumin and BUN/CR ratio. CBC showed normal WBC and hemoglobin. Pt was positive for HSV 1 but  Not  HSV 2 . He was given clotrimazole for his jock itch.  In regards to hand and arm pain. X-ray of foreamr showed of right small to moderate sized olecranon spur. On right hand  X-ray  there is 1.2 mm fracture of tip of middle finger    His main issue is a knot on his left hand present for 10 years. It is tender when he presses on it.  It is about 1-2 cm in diameter. No change in size recently.        HIs other main issue is fishy body odor that has been present for over 15 years. He looked online and may or may not have a genetic disorder. He did look up trimetyhlaminuria.  He does eat foods high in choline such as eggs, nuts,   He has tried varous body odor sprays and bathing and chemicals with no relief. He would like to see a Specialist.  He has not seen Dermatology yet This occurs 2-4 times a year      Arm Pain    The incident occurred more than 1 week ago. The incident occurred at home. The injury mechanism was a fall. The pain is present in the right hand and right forearm. The quality of the pain is described as aching and cramping. The pain does not radiate. The pain is at a severity of 5/10. The pain is mild. The pain has been intermittent since the incident. Associated symptoms include numbness and tingling. Pertinent negatives include no chest pain or muscle weakness. Nothing aggravates the symptoms. He has tried nothing for the symptoms. The treatment provided no relief.   Upper Extremity Issue   This is a chronic problem. The current episode started more than 1 year ago. The problem occurs intermittently. The problem has been gradually worsening. Associated symptoms include numbness. Pertinent negatives include no abdominal pain, anorexia, arthralgias, change in bowel habit, chest pain, chills, congestion, coughing, diaphoresis, fatigue, fever, headaches, joint swelling, myalgias, nausea, neck pain, rash, sore throat, swollen glands, urinary symptoms, vertigo, visual change, vomiting or weakness. Nothing aggravates the symptoms. He has tried NSAIDs for the symptoms. The treatment provided significant relief.          The following portions of the patient's history were reviewed and  "updated as appropriate: allergies, current medications, past family history, past medical history, past social history, past surgical history and problem list.  Patient Active Problem List   Diagnosis   • Annual physical exam   • General medical examination   • Tobacco abuse counseling   • Tobacco user   • Need for vaccination   • Overweight   • Encounter for screening for cardiovascular disorders   • Encounter for screening for diabetes mellitus   • Encounter for screening for endocrine disorder   • Jock itch   • Former tobacco use   • Encounter for screening for HIV   • Screening examination for STD (sexually transmitted disease)   • Pain of right hand   • Pain of right forearm   • HSV infection     Current Outpatient Prescriptions on File Prior to Visit   Medication Sig Dispense Refill   • [DISCONTINUED] clotrimazole-betamethasone (LOTRISONE) 1-0.05 % cream Apply  topically to the appropriate area as directed Every 12 (Twelve) Hours. 45 g 3     No current facility-administered medications on file prior to visit.        Review of Systems   Constitutional: Negative.  Negative for chills, diaphoresis, fatigue and fever.   HENT: Negative.  Negative for congestion and sore throat.    Eyes: Negative.    Respiratory: Negative.  Negative for cough.    Cardiovascular: Negative.  Negative for chest pain.   Gastrointestinal: Negative.  Negative for abdominal pain, anorexia, change in bowel habit, nausea and vomiting.   Endocrine: Negative.    Genitourinary: Negative.    Musculoskeletal: Negative.  Negative for arthralgias, joint swelling, myalgias and neck pain.   Skin: Negative.  Negative for rash.        Abnormal body odor smell    Allergic/Immunologic: Negative.    Neurological: Positive for tingling and numbness. Negative for vertigo and weakness.   Hematological: Negative.    Psychiatric/Behavioral: Negative.        Objective    /90   Pulse 64   Temp 98.6 °F (37 °C)   Ht 182.9 cm (72\")   Wt 91.5 kg (201 lb " 12.8 oz)   SpO2 99%   BMI 27.37 kg/m²      Office Visit on 08/10/2018   Component Date Value Ref Range Status   • WBC 08/14/2018 7.44  3.20 - 9.80 10*3/mm3 Final   • RBC 08/14/2018 4.92  4.37 - 5.74 10*6/mm3 Final   • Hemoglobin 08/14/2018 15.2  13.7 - 17.3 g/dL Final   • Hematocrit 08/14/2018 43.6  39.0 - 49.0 % Final   • MCV 08/14/2018 88.6  80.0 - 98.0 fL Final   • MCH 08/14/2018 30.9  26.5 - 34.0 pg Final   • MCHC 08/14/2018 34.9  31.5 - 36.3 g/dL Final   • RDW 08/14/2018 12.3  11.5 - 14.5 % Final   • RDW-SD 08/14/2018 39.2  35.1 - 43.9 fl Final   • MPV 08/14/2018 9.8  8.0 - 12.0 fL Final   • Platelets 08/14/2018 266  150 - 450 10*3/mm3 Final   • Hemoglobin A1C 08/14/2018 4.7  4 - 5.6 % Final   • Total Cholesterol 08/14/2018 140  0 - 199 mg/dL Final   • Triglycerides 08/14/2018 50  20 - 199 mg/dL Final   • HDL Cholesterol 08/14/2018 60  60 - 200 mg/dL Final   • LDL Cholesterol  08/14/2018 52  1 - 129 mg/dL Final   • LDL/HDL Ratio 08/14/2018 1.17  0.00 - 3.55 Final   • Glucose 08/14/2018 80  60 - 100 mg/dL Final   • BUN 08/14/2018 21  7 - 21 mg/dL Final   • Creatinine 08/14/2018 0.79  0.70 - 1.30 mg/dL Final   • Sodium 08/14/2018 139  137 - 145 mmol/L Final   • Potassium 08/14/2018 4.2  3.5 - 5.1 mmol/L Final   • Chloride 08/14/2018 99  95 - 110 mmol/L Final   • CO2 08/14/2018 29.0  22.0 - 31.0 mmol/L Final   • Calcium 08/14/2018 9.8  8.4 - 10.2 mg/dL Final   • Total Protein 08/14/2018 8.5  6.3 - 8.6 g/dL Final   • Albumin 08/14/2018 5.00* 3.40 - 4.80 g/dL Final   • ALT (SGPT) 08/14/2018 41  21 - 72 U/L Final   • AST (SGOT) 08/14/2018 23  17 - 59 U/L Final   • Alkaline Phosphatase 08/14/2018 44  38 - 126 U/L Final   • Total Bilirubin 08/14/2018 0.9  0.2 - 1.3 mg/dL Final   • eGFR   Amer 08/14/2018 138  70 - 162 mL/min/1.73 Final   • Globulin 08/14/2018 3.5  2.3 - 3.5 gm/dL Final   • A/G Ratio 08/14/2018 1.4  1.1 - 1.8 g/dL Final   • BUN/Creatinine Ratio 08/14/2018 26.6* 7.0 - 25.0 Final   • Anion Gap  08/14/2018 11.0  5.0 - 15.0 mmol/L Final   • TSH 08/14/2018 1.310  0.460 - 4.680 mIU/mL Final   • Free T4 08/14/2018 1.44  0.78 - 2.19 ng/dL Final   • T3, Free 08/14/2018 4.1  2.0 - 4.4 pg/mL Final   • 25 Hydroxy, Vitamin D 08/14/2018 30.9  30.0 - 100.0 ng/ml Final   • Hepatitis C Ab 08/14/2018 Negative  Negative Final   • Hep A IgM 08/14/2018 Negative  Negative Final   • Hep B C IgM 08/14/2018 Negative  Negative Final   • Hepatitis B Surface Ag 08/14/2018 Negative  Negative Final   • Hepatitis C Quantitation 08/14/2018 HCV Not Detected  IU/mL Final   • Test Information 08/14/2018 Comment   Final    The quantitative range of this assay is 15 IU/mL to 100 million IU/mL.   • HSV 1 IgM 08/14/2018 <1:10  <1:10 titer Final   • HSV 2 IgM 08/14/2018 <1:10  <1:10 titer Final    HSV 1 and HSV 2 share many cross-reacting antigens. Elevated titers  to both HSV 1 and HSV 2 may represent crossreactive HSV antibodies  rather than exposure to both HSV 1 and HSV 2.  Results for this test are for research purposes only by the assay's  . The performance characteristics of this product have  not been established.  Results should not be used as a diagnostic  procedure without confirmation of the diagnosis by another medically  established diagnostic product or procedure.   • HSV 1 IgG, Type Specific 08/14/2018 17.60* 0.00 - 0.90 index Final                                     Negative        <0.91                                   Equivocal 0.91 - 1.09                                   Positive        >1.09   Note: Negative indicates no antibodies detected to   HSV-1. Equivocal may suggest early infection.  If   clinically appropriate, retest at later date. Positive   indicates antibodies detected to HSV-1.   • HSV 2 IgG 08/14/2018 <0.91  0.00 - 0.90 index Final                                     Negative        <0.91                                   Equivocal 0.91 - 1.09                                   Positive         >1.09   Note: Negative indicates no antibodies detected to   HSV-2. Equivocal may suggest early infection.  If   clinically appropriate, retest at later date. Positive   indicates antibodies detected to HSV-2.   • RPR 08/14/2018 Non-Reactive  Non-Reactive Final   • HIV-2 Ab 08/14/2018 Negative  Negative Final    No detectable HIV-2 antibody by JOSE.  Test performed with [employer] GS HIV-2 EIA Kit.   • HIV-1/ HIV-2 08/14/2018 Negative  Negative Final   • Neutrophil % 08/14/2018 37.0  37.0 - 80.0 % Final   • Lymphocyte % 08/14/2018 49.0  10.0 - 50.0 % Final   • Monocyte % 08/14/2018 3.0  0.0 - 12.0 % Final   • Eosinophil % 08/14/2018 1.0  0.0 - 7.0 % Final   • Basophil % 08/14/2018 1.0  0.0 - 2.0 % Final   • Atypical Lymphocyte % 08/14/2018 9.0* 0.0 - 5.0 % Final   • Neutrophils Absolute 08/14/2018 2.75  2.00 - 8.60 10*3/mm3 Final   • Lymphocytes Absolute 08/14/2018 3.65  0.60 - 4.20 10*3/mm3 Final   • Monocytes Absolute 08/14/2018 0.22  0.00 - 0.90 10*3/mm3 Final   • Eosinophils Absolute 08/14/2018 0.07  0.00 - 0.70 10*3/mm3 Final   • Basophils Absolute 08/14/2018 0.07  0.00 - 0.20 10*3/mm3 Final   • RBC Morphology 08/14/2018 Normal  Normal Final   • WBC Morphology 08/14/2018 Normal  Normal Final   • Platelet Morphology 08/14/2018 Normal  Normal Final         Two view right forearm     HISTORY: Pain. No known injury.     AP and lateral views obtained.     COMPARISON: None     Small to moderate-sized olecranon spur.  No fracture or dislocation.  No other osseous or articular abnormality.     IMPRESSION:  CONCLUSION:  Small to moderate-sized olecranon spur.     [id number]     Electronically signed by:  Darell Novoa MD  8/10/2018 1:02 PM CDT  Workstation: [employer]    Three view right hand     HISTORY: Right hand pain. No known injury.     AP, lateral and oblique views obtained.     COMPARISON: None     Possible old 1.2 mm fracture tip of the tuft of the middle  finger.  No acute fracture or dislocation.  No other  osseous or articular abnormality.     IMPRESSION:  CONCLUSION:  Possible old 1.2 mm fracture tip of the tuft of the middle  finger.     [id number]     Electronically signed by:  Darell Novoa MD  8/10/2018 1:06 PM CDT  Workstation: [employer]  Physical Exam   Constitutional: He is oriented to person, place, and time. He appears well-developed and well-nourished. No distress.   HENT:   Head: Normocephalic and atraumatic.   Right Ear: External ear normal.   Left Ear: External ear normal.   Eyes: Pupils are equal, round, and reactive to light. Conjunctivae and EOM are normal. Right eye exhibits no discharge. Left eye exhibits no discharge. No scleral icterus.   Neck: Normal range of motion. Neck supple. No JVD present. No tracheal deviation present. No thyromegaly present.   Cardiovascular: Normal rate, regular rhythm and normal heart sounds.    Pulmonary/Chest: Effort normal. No respiratory distress. He has no wheezes.   Abdominal: Soft. He exhibits no distension. There is no tenderness. There is no guarding.   Musculoskeletal: Normal range of motion. He exhibits no edema, tenderness or deformity.        Arms:  Lymphadenopathy:     He has no cervical adenopathy.   Neurological: He is oriented to person, place, and time. No cranial nerve deficit.   Skin: Skin is warm and dry. No rash noted. He is not diaphoretic. No erythema. No pallor.   Psychiatric: He has a normal mood and affect. His behavior is normal.   Nursing note and vitals reviewed.        Assessment/Plan   Problems Addressed this Visit        Nervous and Auditory    Pain of right hand - Primary    Pain of right forearm       Musculoskeletal and Integument    Jock itch       Other    Tobacco user    HSV infection      -annual physical exam last visit   -went over labwork today including HIV test results which were negative   -regarding history of pain in right arm - x-ray of right forearm showed olecranon spur and right hand showed 1.2 mm fracture of tip of  middle finger   -pain in left hand/mass in left hand - will get x-ray of left hand. Consider Orthopedic referral if needed   - for fishy odor smell - may be genetic disease such as trimethylaminuria or hypermethiomina.    Gave foods to avoid. Will also refer to Dermatologist Dr. Bello for further evaluation. Consultation appreciated   -STD screening panel  Including HIV test   -recheck in 1-2 weeks   -jock itch - information given today. Will start on clotrimazole cream TID. Drug information provided   -will get labs including cbc, cmp,lipid panel, thyroid studies, hga1c. And vitamin D levels   -HSV type 1 positive - gave information on cold sores and herpes to go home with.  Counseled on safe sex and condom use   -smoking cessation tips given today.    -tdap vaccination last visit   -for rash on inguinal area - possible jock itch. - will give lotrione cream   -advised pt to be safe and call with any questions or concerns. All questions addressed today.    -will write letter stating pt can go back to work          This document has been electronically signed by Rey Nunez MD on August 24, 2018 11:31 AM

## 2018-08-24 ENCOUNTER — OFFICE VISIT (OUTPATIENT)
Dept: FAMILY MEDICINE CLINIC | Facility: CLINIC | Age: 33
End: 2018-08-24

## 2018-08-24 VITALS
HEIGHT: 72 IN | DIASTOLIC BLOOD PRESSURE: 90 MMHG | WEIGHT: 201.8 LBS | SYSTOLIC BLOOD PRESSURE: 146 MMHG | HEART RATE: 64 BPM | TEMPERATURE: 98.6 F | OXYGEN SATURATION: 99 % | BODY MASS INDEX: 27.33 KG/M2

## 2018-08-24 DIAGNOSIS — B35.6 JOCK ITCH: ICD-10-CM

## 2018-08-24 DIAGNOSIS — Z72.0 TOBACCO USER: ICD-10-CM

## 2018-08-24 DIAGNOSIS — L75.0 ABNORMAL BODY ODOR: Primary | ICD-10-CM

## 2018-08-24 DIAGNOSIS — R22.32 MASS OF HAND, LEFT: ICD-10-CM

## 2018-08-24 DIAGNOSIS — M79.642 LEFT HAND PAIN: ICD-10-CM

## 2018-08-24 DIAGNOSIS — Z70.9 SEX COUNSELING: ICD-10-CM

## 2018-08-24 DIAGNOSIS — M79.631 PAIN OF RIGHT FOREARM: ICD-10-CM

## 2018-08-24 DIAGNOSIS — M25.729 OLECRANON BONE SPUR: ICD-10-CM

## 2018-08-24 DIAGNOSIS — M79.641 PAIN OF RIGHT HAND: ICD-10-CM

## 2018-08-24 DIAGNOSIS — B00.9 HSV INFECTION: ICD-10-CM

## 2018-08-24 PROCEDURE — 99214 OFFICE O/P EST MOD 30 MIN: CPT | Performed by: FAMILY MEDICINE

## 2018-08-24 NOTE — PATIENT INSTRUCTIONS
Foods to avoid   Eggs. Liver. Kidney. Peas. Beans. Peanuts. Soy products. Brassicas (brussel sprouts, broccoli, cabbage, and cauliflower)    Get x-ray of hand  -will refer to Dermatologist Dr. Bello regarding body odor        Cold Sore  A cold sore, also called a fever blister, is a skin infection that causes small, fluid-filled sores to form inside of the mouth or on the lips, gums, nose, chin, or cheeks. Cold sores can spread to other parts of the body, such as the eyes or fingers. In some people with other medical conditions, cold sores can spread to multiple other body sites, including the genitals. Cold sores can be spread or passed from person to person (contagious) until the sores crust over completely.  What are the causes?  Cold sores are caused by the herpes simplex virus (HSV-1). HSV-1 is closely related to the virus that causes genital herpes (HSV-2), but these viruses are not the same. Once a person is infected with HSV-1, the virus remains permanently in the body.  HSV-1 is spread from person to person through close contact, such as through kissing, touching the affected area, or sharing personal items such as lip balm, razors, or eating utensils.  What increases the risk?  A cold sore outbreak is more likely to develop in people who:  · Are tired, stressed, or sick.  · Are menstruating.  · Are pregnant.  · Take certain medicines.  · Are exposed to cold weather or too much sun.    What are the signs or symptoms?  Symptoms of a cold sore outbreak often go through different stages. Here is how a cold sore develops:  · Tingling, itching, or burning is felt 1-2 days before the outbreak.  · Fluid-filled blisters appear on the lips, inside the mouth, on the nose, or on the cheeks.  · The blisters start to ooze clear fluid.  · The blisters dry up and a yellow crust appears in its place.  · The crust falls off.    Other symptoms include:  · Fever.  · Sore throat.  · Headache.  · Muscle aches.  · Swollen neck  glands.    You also may not have any symptoms.  How is this diagnosed?  This condition is often diagnosed based on your medical history and a physical exam. Your health care provider may swab your sore and then examine it in the lab. Rarely, blood tests may be done to check for HSV-1.  How is this treated?  There is no cure for cold sores or HSV-1. There also is no vaccine for HSV-1. Most cold sores go away on their own without treatment within two weeks. Medicines cannot make the infection go away, but medicines can:  · Help relieve some of the pain associated with the sores.  · Work to stop the virus from multiplying.  · Shorten healing time.    Medicines may be in the form of creams, gels, pills, or a shot.  Follow these instructions at home:  Medicines  · Take or apply over-the-counter and prescription medicines only as told by your health care provider.  · Use a cotton-tip swab to apply creams or gels to your sores.  Sore Care  · Do not touch the sores or pick the scabs.  · Wash your hands often. Do not touch your eyes without washing your hands first.  · Keep the sores clean and dry.  · If directed, apply ice to the sores:  ? Put ice in a plastic bag.  ? Place a towel between your skin and the bag.  ? Leave the ice on for 20 minutes, 2-3 times per day.  Lifestyle  · Do not kiss, have oral sex, or share personal items until your sores heal.  · Eat a soft, bland diet. Avoid eating hot, cold, or salty foods. These can hurt your mouth.  · Use a straw if it hurts to drink out of a glass.  · Avoid the sun and limit your stress if these things trigger outbreaks. If sun causes cold sores, apply sunscreen on your lips before being out in the sun.  Contact a health care provider if:  · You have symptoms for more than two weeks.  · You have pus coming from the sores.  · You have redness that is spreading.  · You have pain or irritation in your eye.  · You get sores on your genitals.  · Your sores do not heal within two  weeks.  · You have frequent cold sore outbreaks.  Get help right away if:  · You have a fever and your symptoms suddenly get worse.  · You have a headache and confusion.  This information is not intended to replace advice given to you by your health care provider. Make sure you discuss any questions you have with your health care provider.  Document Released: 12/15/2001 Document Revised: 08/11/2017 Document Reviewed: 10/07/2016  [employer] Interactive Patient Education © 2018 [employer]  Herpes Simplex Test  There are two common types of herpes simplex virus (HSV). These are classified as Type 1 (HSV1) or Type 2 (HSV2). Type 1 often causes cold sores on or around the mouth and sometimes on or around the eyes. Type 2 is commonly known as a sexually transmitted infection that causes sores in and around the genitals. Both types of herpes simplex can cause sores in different areas.  There are two types of herpes simplex tests. These include:  · Culture. This consists of collecting and testing a sample of fluid with a cotton swab from an open sore. This can only be done during an active infection (outbreak). Culture tests take several days to complete but are very accurate.  · HSV blood tests. This test is not as accurate as a culture. However, HSV blood tests are faster than cultures, often providing a test result within one day.  ? HSV antibody test. This checks for the presence of antibodies against HSV in your blood. Antibodies are proteins your body makes to help fight infection.  ? HSV antigen test. This checks for the presence of the HSV germ (antigen) in your blood.    Your health care provider may recommend you have a HSV test if:  · He or she believes you have a HSV infection.  · You have a weakened immune system (immunocompromised) and you have sores around your mouth or genitals that look like HSV eruptions.  · You have a fever of unknown origin (FUO).  · You are pregnant, have herpes, and are expecting to  deliver a baby vaginally in the next 6-8 weeks.    What do the results mean?  It is your responsibility to obtain your test results. Ask the lab or department performing the test when and how you will get your results. Contact your health care provider to discuss any questions you have about your results.  Range of Normal Values  Ranges for normal values may vary among different labs and hospitals. You should always check with your health care provider after having lab work or other tests done to discuss whether your values are considered within normal limits. Normal findings include:  · No HSV antigen or antibodies present in your blood.  · No HSV antigen present in cultured fluid.    Meaning of Results Outside Normal Ranges  The following test results may indicate that you have an active HSV infection:  · Positive culture for HSV1 or HSV2.  · Presence of HSV1 or HSV2 antigens in your blood.  · Presence of certain HSV1 or HSV2 antibodies (IgM) in your blood.    Discuss your test results with your health care provider. He or she will use the results to make a diagnosis and determine a treatment plan that is right for you.  Talk with your health care provider to discuss your results, treatment options, and if necessary, the need for more tests. Talk with your health care provider if you have any questions about your results.  This information is not intended to replace advice given to you by your health care provider. Make sure you discuss any questions you have with your health care provider.  Document Released: 01/20/2006 Document Revised: 08/23/2017 Document Reviewed: 05/05/2015  [employer] Interactive Patient Education © 2018 [employer]